# Patient Record
Sex: FEMALE | Race: BLACK OR AFRICAN AMERICAN | NOT HISPANIC OR LATINO | URBAN - METROPOLITAN AREA
[De-identification: names, ages, dates, MRNs, and addresses within clinical notes are randomized per-mention and may not be internally consistent; named-entity substitution may affect disease eponyms.]

---

## 2020-01-15 PROBLEM — 38341003 HYPERTENSION: Status: ACTIVE | Noted: 2020-01-15

## 2021-03-24 ENCOUNTER — OFFICE VISIT (OUTPATIENT)
Dept: URBAN - METROPOLITAN AREA CLINIC 13 | Facility: CLINIC | Age: 76
End: 2021-03-24

## 2021-03-30 ENCOUNTER — OFFICE VISIT (OUTPATIENT)
Dept: URBAN - METROPOLITAN AREA SURGERY CENTER 27 | Facility: SURGERY CENTER | Age: 76
End: 2021-03-30

## 2021-03-30 PROBLEM — 195967001 ASTHMA: Status: ACTIVE | Noted: 2021-03-30

## 2021-03-31 ENCOUNTER — OFFICE VISIT (OUTPATIENT)
Dept: URBAN - METROPOLITAN AREA CLINIC 13 | Facility: CLINIC | Age: 76
End: 2021-03-31

## 2021-08-28 ENCOUNTER — TELEPHONE ENCOUNTER (OUTPATIENT)
Dept: URBAN - METROPOLITAN AREA CLINIC 13 | Facility: CLINIC | Age: 76
End: 2021-08-28

## 2021-08-29 ENCOUNTER — TELEPHONE ENCOUNTER (OUTPATIENT)
Dept: URBAN - METROPOLITAN AREA CLINIC 13 | Facility: CLINIC | Age: 76
End: 2021-08-29

## 2021-08-29 RX ORDER — BUDESONIDE AND FORMOTEROL FUMARATE DIHYDRATE 160; 4.5 UG/1; UG/1
AEROSOL RESPIRATORY (INHALATION)
Status: ACTIVE | COMMUNITY

## 2021-08-29 RX ORDER — NAPROXEN SODIUM 220 MG
TABLET ORAL
Status: ACTIVE | COMMUNITY

## 2021-08-29 RX ORDER — ALBUTEROL SULFATE 90 UG/1
AEROSOL, METERED RESPIRATORY (INHALATION)
Status: ACTIVE | COMMUNITY

## 2021-08-29 RX ORDER — METOPROLOL TARTRATE 50 MG/1
TABLET, FILM COATED ORAL
Status: ACTIVE | COMMUNITY

## 2021-08-29 RX ORDER — IBUPROFEN 200 MG/1
TABLET, COATED ORAL
Status: ACTIVE | COMMUNITY

## 2023-05-05 ENCOUNTER — TELEPHONE ENCOUNTER (OUTPATIENT)
Dept: URBAN - METROPOLITAN AREA CLINIC 44 | Facility: CLINIC | Age: 78
End: 2023-05-05

## 2023-05-05 VITALS — BODY MASS INDEX: 25.52 KG/M2 | WEIGHT: 130 LBS | HEIGHT: 60 IN

## 2023-05-05 RX ORDER — ORAL SEMAGLUTIDE 3 MG/1
AS DIRECTED TABLET ORAL
Status: ACTIVE | COMMUNITY

## 2023-05-05 RX ORDER — IBUPROFEN 200 MG/1
1 TABLET WITH FOOD OR MILK AS NEEDED TABLET, COATED ORAL
COMMUNITY

## 2023-05-05 RX ORDER — METOPROLOL TARTRATE 50 MG/1
1 TABLET WITH FOOD TABLET, FILM COATED ORAL TWICE A DAY
COMMUNITY

## 2023-05-05 RX ORDER — NAPROXEN SODIUM 220 MG
1 TABLET WITH FOOD OR MILK AS NEEDED TABLET ORAL
COMMUNITY

## 2023-05-05 RX ORDER — METFORMIN HYDROCHLORIDE 500 MG/1
TAKE ONE TABLET BY MOUTH TWICE A DAY TABLET, EXTENDED RELEASE ORAL
Qty: 180 UNSPECIFIED | Refills: 0 | Status: ACTIVE | COMMUNITY

## 2023-05-05 RX ORDER — ATORVASTATIN CALCIUM 20 MG/1
TAKE ONE TABLET BY MOUTH ONE TIME DAILY TABLET, FILM COATED ORAL
Qty: 90 UNSPECIFIED | Refills: 0 | Status: ACTIVE | COMMUNITY

## 2023-05-05 RX ORDER — BUDESONIDE AND FORMOTEROL FUMARATE DIHYDRATE 160; 4.5 UG/1; UG/1
AEROSOL RESPIRATORY (INHALATION)
Status: DISCONTINUED | COMMUNITY

## 2023-05-05 RX ORDER — ALBUTEROL SULFATE 90 UG/1
AEROSOL, METERED RESPIRATORY (INHALATION)
Status: DISCONTINUED | COMMUNITY

## 2023-05-08 ENCOUNTER — LAB OUTSIDE AN ENCOUNTER (OUTPATIENT)
Dept: URBAN - METROPOLITAN AREA CLINIC 44 | Facility: CLINIC | Age: 78
End: 2023-05-08

## 2023-05-16 ENCOUNTER — TELEPHONE ENCOUNTER (OUTPATIENT)
Dept: URBAN - METROPOLITAN AREA CLINIC 44 | Facility: CLINIC | Age: 78
End: 2023-05-16

## 2023-05-16 ENCOUNTER — OFFICE VISIT (OUTPATIENT)
Dept: URBAN - METROPOLITAN AREA SURGERY CENTER 28 | Facility: SURGERY CENTER | Age: 78
End: 2023-05-16
Payer: MEDICARE

## 2023-05-16 DIAGNOSIS — R13.19 CERVICAL DYSPHAGIA: ICD-10-CM

## 2023-05-16 DIAGNOSIS — Z98.890 H/O ABDOMINAL SURGERY: ICD-10-CM

## 2023-05-16 DIAGNOSIS — K22.2 ACQUIRED ESOPHAGEAL RING: ICD-10-CM

## 2023-05-16 PROCEDURE — 43248 EGD GUIDE WIRE INSERTION: CPT | Performed by: INTERNAL MEDICINE

## 2023-05-16 PROCEDURE — G8907 PT DOC NO EVENTS ON DISCHARG: HCPCS | Performed by: INTERNAL MEDICINE

## 2023-05-16 RX ORDER — NAPROXEN SODIUM 220 MG
1 TABLET WITH FOOD OR MILK AS NEEDED TABLET ORAL
COMMUNITY

## 2023-05-16 RX ORDER — METFORMIN HYDROCHLORIDE 500 MG/1
TAKE ONE TABLET BY MOUTH TWICE A DAY TABLET, EXTENDED RELEASE ORAL
Qty: 180 UNSPECIFIED | Refills: 0 | Status: ACTIVE | COMMUNITY

## 2023-05-16 RX ORDER — METOPROLOL TARTRATE 50 MG/1
1 TABLET WITH FOOD TABLET, FILM COATED ORAL TWICE A DAY
COMMUNITY

## 2023-05-16 RX ORDER — IBUPROFEN 200 MG/1
1 TABLET WITH FOOD OR MILK AS NEEDED TABLET, COATED ORAL
COMMUNITY

## 2023-05-16 RX ORDER — ORAL SEMAGLUTIDE 3 MG/1
AS DIRECTED TABLET ORAL
Status: ACTIVE | COMMUNITY

## 2023-05-16 RX ORDER — PANTOPRAZOLE SODIUM 40 MG/1
1 TABLET TABLET, DELAYED RELEASE ORAL ONCE A DAY
Qty: 90 TABLET | Refills: 3 | OUTPATIENT
Start: 2023-05-16

## 2023-05-16 RX ORDER — ATORVASTATIN CALCIUM 20 MG/1
TAKE ONE TABLET BY MOUTH ONE TIME DAILY TABLET, FILM COATED ORAL
Qty: 90 UNSPECIFIED | Refills: 0 | Status: ACTIVE | COMMUNITY

## 2023-07-05 ENCOUNTER — OFFICE VISIT (OUTPATIENT)
Dept: URBAN - METROPOLITAN AREA CLINIC 48 | Facility: CLINIC | Age: 78
End: 2023-07-05
Payer: MEDICARE

## 2023-07-05 VITALS
HEART RATE: 69 BPM | DIASTOLIC BLOOD PRESSURE: 65 MMHG | HEIGHT: 60 IN | TEMPERATURE: 97.5 F | WEIGHT: 126.2 LBS | SYSTOLIC BLOOD PRESSURE: 112 MMHG | BODY MASS INDEX: 24.77 KG/M2 | OXYGEN SATURATION: 99 %

## 2023-07-05 DIAGNOSIS — R09.89 GLOBUS SENSATION: ICD-10-CM

## 2023-07-05 DIAGNOSIS — R63.4 WEIGHT LOSS: ICD-10-CM

## 2023-07-05 DIAGNOSIS — R13.14 DYSPHAGIA, PHARYNGOESOPHAGEAL PHASE: ICD-10-CM

## 2023-07-05 DIAGNOSIS — R68.81 EARLY SATIETY: ICD-10-CM

## 2023-07-05 PROCEDURE — 99214 OFFICE O/P EST MOD 30 MIN: CPT | Performed by: PHYSICIAN ASSISTANT

## 2023-07-05 RX ORDER — PANTOPRAZOLE SODIUM 40 MG/1
1 TABLET TABLET, DELAYED RELEASE ORAL ONCE A DAY
Qty: 90 TABLET | Refills: 3 | Status: ACTIVE | COMMUNITY
Start: 2023-05-16

## 2023-07-05 RX ORDER — FAMOTIDINE 40 MG/1
1 TABLET AT BEDTIME TABLET, FILM COATED ORAL ONCE A DAY
Qty: 30 | Refills: 3 | OUTPATIENT
Start: 2023-07-05

## 2023-07-05 RX ORDER — PNV NO.95/FERROUS FUM/FOLIC AC 28MG-0.8MG
AS DIRECTED TABLET ORAL ONCE DAILY
Status: ACTIVE | COMMUNITY

## 2023-07-05 RX ORDER — ATORVASTATIN CALCIUM 20 MG/1
1 TABLET TABLET, FILM COATED ORAL ONCE A DAY
Refills: 0 | Status: ACTIVE | COMMUNITY

## 2023-07-05 RX ORDER — METOPROLOL TARTRATE 50 MG/1
1 TABLET WITH FOOD TABLET, FILM COATED ORAL TWICE A DAY
Status: ACTIVE | COMMUNITY

## 2023-07-05 RX ORDER — METFORMIN HYDROCHLORIDE 500 MG/1
1 TABLET WITH EVENING MEAL TABLET, EXTENDED RELEASE ORAL TWICE A DAY
Refills: 0 | Status: ACTIVE | COMMUNITY

## 2023-07-05 RX ORDER — PANTOPRAZOLE SODIUM 40 MG/1
1 TABLET TABLET, DELAYED RELEASE ORAL ONCE A DAY
OUTPATIENT
Start: 2023-05-16

## 2023-07-05 RX ORDER — IBUPROFEN 200 MG/1
1 TABLET WITH FOOD OR MILK AS NEEDED TABLET, COATED ORAL
Status: ACTIVE | COMMUNITY

## 2023-07-05 NOTE — HPI-TODAY'S VISIT:
76 y/o female presents for follow up from EGD done in May for dysphagia which revealed an intact Nissen and benign esophageal stenosis dilated to 48 FR. She continues to have episodes of dysphagia, as well as globus sensation, throat clearing, hoarseness, and cough. The chest pain she was having has improved since being on Pantoprazole daily which she continues to take. She does admit to some post nasal drainage and has not taken anything for this. She has early satiety and belching, but denies epigastric pain or N/V. She was diagnosed with DM in early 2021 and is on Metformin; she was put on Rybelsus in January which was discontinued 3 weeks ago. She has lost 20-25 pounds since January. Additionally, she rpeorts recent change in bowels in the last several weeks with episdoes of post-prandial diarrhea; denies nocturnal diarrhea or blood in the stool. Last colonoscopy was done 11/2014 with diverticulosis and internal hemorrhoids.

## 2023-07-12 ENCOUNTER — LAB OUTSIDE AN ENCOUNTER (OUTPATIENT)
Dept: URBAN - METROPOLITAN AREA CLINIC 46 | Facility: CLINIC | Age: 78
End: 2023-07-12

## 2023-07-17 ENCOUNTER — TELEPHONE ENCOUNTER (OUTPATIENT)
Dept: URBAN - METROPOLITAN AREA CLINIC 46 | Facility: CLINIC | Age: 78
End: 2023-07-17

## 2023-07-17 LAB
ADENOVIRUS F 40/41: NOT DETECTED
CALPROTECTIN, STOOL - QDX: (no result)
CAMPYLOBACTER: NOT DETECTED
CLOSTRIDIUM DIFFICILE: NOT DETECTED
ENTAMOEBA HISTOLYTICA: NOT DETECTED
ENTEROAGGREGATIVE E.COLI: NOT DETECTED
ENTEROTOXIGENIC E.COLI: NOT DETECTED
ESCHERICHIA COLI O157: NOT DETECTED
GIARDIA LAMBLIA: NOT DETECTED
NOROVIRUS GI/GII: NOT DETECTED
PANCREATICELASTASE ELISA, STOOL: (no result)
ROTAVIRUS A: NOT DETECTED
SALMONELLA SPP.: NOT DETECTED
SHIGA-LIKE TOXIN PRODUCING E.COLI: NOT DETECTED
SHIGELLA SPP. / ENTEROINVASIVE E.COLI: NOT DETECTED
VIBRIO PARAHAEMOLYTICUS: NOT DETECTED
VIBRIO SPP.: NOT DETECTED
YERSINIA ENTEROCOLITICA: NOT DETECTED

## 2023-07-17 RX ORDER — PANCRELIPASE 36000; 180000; 114000 [USP'U]/1; [USP'U]/1; [USP'U]/1
AS DIRECTED CAPSULE, DELAYED RELEASE PELLETS ORAL
Qty: 250 | Refills: 5 | OUTPATIENT
Start: 2023-07-17

## 2023-07-18 ENCOUNTER — TELEPHONE ENCOUNTER (OUTPATIENT)
Dept: URBAN - METROPOLITAN AREA CLINIC 46 | Facility: CLINIC | Age: 78
End: 2023-07-18

## 2023-07-19 ENCOUNTER — OFFICE VISIT (OUTPATIENT)
Dept: URBAN - METROPOLITAN AREA SURGERY CENTER 28 | Facility: SURGERY CENTER | Age: 78
End: 2023-07-19

## 2023-08-08 ENCOUNTER — CLAIMS CREATED FROM THE CLAIM WINDOW (OUTPATIENT)
Dept: URBAN - METROPOLITAN AREA CLINIC 4 | Facility: CLINIC | Age: 78
End: 2023-08-08
Payer: MEDICARE

## 2023-08-08 ENCOUNTER — OUT OF OFFICE VISIT (OUTPATIENT)
Dept: URBAN - METROPOLITAN AREA SURGERY CENTER 28 | Facility: SURGERY CENTER | Age: 78
End: 2023-08-08
Payer: MEDICARE

## 2023-08-08 DIAGNOSIS — C18.9 MALIGNANT NEOPLASM OF COLON, UNSPECIFIED: ICD-10-CM

## 2023-08-08 DIAGNOSIS — R13.19 CERVICAL DYSPHAGIA: ICD-10-CM

## 2023-08-08 DIAGNOSIS — D12.2 ADENOMA OF ASCENDING COLON: ICD-10-CM

## 2023-08-08 DIAGNOSIS — K22.2 ACQUIRED ESOPHAGEAL RING: ICD-10-CM

## 2023-08-08 DIAGNOSIS — K63.5 BENIGN COLON POLYPS: ICD-10-CM

## 2023-08-08 DIAGNOSIS — R68.89 ERYTHEMATOUS MUCOSA: ICD-10-CM

## 2023-08-08 DIAGNOSIS — K22.89 DILATATION OF ESOPHAGUS: ICD-10-CM

## 2023-08-08 DIAGNOSIS — K21.9 ACID REFLUX: ICD-10-CM

## 2023-08-08 DIAGNOSIS — R63.4 ABNORMAL INTENTIONAL WEIGHT LOSS: ICD-10-CM

## 2023-08-08 DIAGNOSIS — C18.6 ADENOCARCINOMA OF DESCENDING COLON: ICD-10-CM

## 2023-08-08 DIAGNOSIS — K22.89 OTHER SPECIFIED DISEASE OF ESOPHAGUS: ICD-10-CM

## 2023-08-08 DIAGNOSIS — R13.10 DYSPHAGIA: ICD-10-CM

## 2023-08-08 DIAGNOSIS — R63.4 WEIGHT LOSS: ICD-10-CM

## 2023-08-08 PROCEDURE — 43248 EGD GUIDE WIRE INSERTION: CPT | Performed by: INTERNAL MEDICINE

## 2023-08-08 PROCEDURE — 88305 TISSUE EXAM BY PATHOLOGIST: CPT | Performed by: PATHOLOGY

## 2023-08-08 PROCEDURE — G8907 PT DOC NO EVENTS ON DISCHARG: HCPCS | Performed by: INTERNAL MEDICINE

## 2023-08-08 PROCEDURE — 45381 COLONOSCOPY SUBMUCOUS NJX: CPT | Performed by: INTERNAL MEDICINE

## 2023-08-08 PROCEDURE — 00813 ANES UPR LWR GI NDSC PX: CPT | Performed by: ANESTHESIOLOGY

## 2023-08-08 PROCEDURE — 88341 IMHCHEM/IMCYTCHM EA ADD ANTB: CPT | Performed by: PATHOLOGY

## 2023-08-08 PROCEDURE — 88342 IMHCHEM/IMCYTCHM 1ST ANTB: CPT | Performed by: PATHOLOGY

## 2023-08-08 PROCEDURE — 45380 COLONOSCOPY AND BIOPSY: CPT | Performed by: INTERNAL MEDICINE

## 2023-08-08 RX ORDER — METFORMIN HYDROCHLORIDE 500 MG/1
1 TABLET WITH EVENING MEAL TABLET, EXTENDED RELEASE ORAL TWICE A DAY
Refills: 0 | Status: ACTIVE | COMMUNITY

## 2023-08-08 RX ORDER — ATORVASTATIN CALCIUM 20 MG/1
1 TABLET TABLET, FILM COATED ORAL ONCE A DAY
Refills: 0 | Status: ACTIVE | COMMUNITY

## 2023-08-08 RX ORDER — PNV NO.95/FERROUS FUM/FOLIC AC 28MG-0.8MG
AS DIRECTED TABLET ORAL ONCE DAILY
Status: ACTIVE | COMMUNITY

## 2023-08-08 RX ORDER — PANTOPRAZOLE SODIUM 40 MG/1
1 TABLET TABLET, DELAYED RELEASE ORAL ONCE A DAY
Status: ACTIVE | COMMUNITY
Start: 2023-05-16

## 2023-08-08 RX ORDER — PANCRELIPASE 36000; 180000; 114000 [USP'U]/1; [USP'U]/1; [USP'U]/1
AS DIRECTED CAPSULE, DELAYED RELEASE PELLETS ORAL
Qty: 250 | Refills: 5 | Status: ACTIVE | COMMUNITY
Start: 2023-07-17

## 2023-08-08 RX ORDER — FAMOTIDINE 40 MG/1
1 TABLET AT BEDTIME TABLET, FILM COATED ORAL ONCE A DAY
Qty: 30 | Refills: 3 | Status: ACTIVE | COMMUNITY
Start: 2023-07-05

## 2023-08-08 RX ORDER — IBUPROFEN 200 MG/1
1 TABLET WITH FOOD OR MILK AS NEEDED TABLET, COATED ORAL
Status: ACTIVE | COMMUNITY

## 2023-08-08 RX ORDER — METOPROLOL TARTRATE 50 MG/1
1 TABLET WITH FOOD TABLET, FILM COATED ORAL TWICE A DAY
Status: ACTIVE | COMMUNITY

## 2023-08-09 ENCOUNTER — TELEPHONE ENCOUNTER (OUTPATIENT)
Dept: URBAN - METROPOLITAN AREA CLINIC 48 | Facility: CLINIC | Age: 78
End: 2023-08-09

## 2023-08-10 ENCOUNTER — TELEPHONE ENCOUNTER (OUTPATIENT)
Dept: URBAN - METROPOLITAN AREA SURGERY CENTER 28 | Facility: SURGERY CENTER | Age: 78
End: 2023-08-10

## 2023-08-10 PROBLEM — 363409003: Status: ACTIVE | Noted: 2023-08-10

## 2023-08-21 ENCOUNTER — TELEPHONE ENCOUNTER (OUTPATIENT)
Dept: URBAN - METROPOLITAN AREA CLINIC 63 | Facility: CLINIC | Age: 78
End: 2023-08-21

## 2023-11-20 ENCOUNTER — CLAIMS CREATED FROM THE CLAIM WINDOW (OUTPATIENT)
Dept: URBAN - METROPOLITAN AREA CLINIC 48 | Facility: CLINIC | Age: 78
End: 2023-11-20
Payer: MEDICARE

## 2023-11-20 VITALS
SYSTOLIC BLOOD PRESSURE: 129 MMHG | BODY MASS INDEX: 24.3 KG/M2 | WEIGHT: 123.8 LBS | HEIGHT: 60 IN | OXYGEN SATURATION: 97 % | HEART RATE: 72 BPM | TEMPERATURE: 97.5 F | DIASTOLIC BLOOD PRESSURE: 67 MMHG

## 2023-11-20 DIAGNOSIS — K21.9 ACID REFLUX: ICD-10-CM

## 2023-11-20 DIAGNOSIS — R13.14 DYSPHAGIA, PHARYNGOESOPHAGEAL PHASE: ICD-10-CM

## 2023-11-20 DIAGNOSIS — C18.6 MALIGNANT NEOPLASM OF DESCENDING COLON: ICD-10-CM

## 2023-11-20 DIAGNOSIS — R63.4 WEIGHT LOSS: ICD-10-CM

## 2023-11-20 PROBLEM — 266435005: Status: ACTIVE | Noted: 2023-11-20

## 2023-11-20 PROCEDURE — 99214 OFFICE O/P EST MOD 30 MIN: CPT | Performed by: PHYSICIAN ASSISTANT

## 2023-11-20 RX ORDER — FAMOTIDINE 40 MG/1
1 TABLET AT BEDTIME TABLET, FILM COATED ORAL ONCE A DAY
Qty: 30 | Refills: 3 | Status: ACTIVE | COMMUNITY
Start: 2023-07-05

## 2023-11-20 RX ORDER — METFORMIN HYDROCHLORIDE 500 MG/1
1 TABLET WITH EVENING MEAL TABLET, EXTENDED RELEASE ORAL TWICE A DAY
Refills: 0 | Status: ACTIVE | COMMUNITY

## 2023-11-20 RX ORDER — PANTOPRAZOLE SODIUM 40 MG/1
1 TABLET TABLET, DELAYED RELEASE ORAL ONCE A DAY
Status: ACTIVE | COMMUNITY
Start: 2023-05-16

## 2023-11-20 RX ORDER — IBUPROFEN 200 MG/1
1 TABLET WITH FOOD OR MILK AS NEEDED TABLET, COATED ORAL
Status: ACTIVE | COMMUNITY

## 2023-11-20 RX ORDER — PANTOPRAZOLE SODIUM 40 MG/1
1 TABLET TABLET, DELAYED RELEASE ORAL ONCE A DAY
Qty: 90 | Refills: 3
Start: 2023-05-16

## 2023-11-20 RX ORDER — PNV NO.95/FERROUS FUM/FOLIC AC 28MG-0.8MG
AS DIRECTED TABLET ORAL ONCE DAILY
Status: ACTIVE | COMMUNITY

## 2023-11-20 RX ORDER — METOPROLOL TARTRATE 50 MG/1
1 TABLET WITH FOOD TABLET, FILM COATED ORAL TWICE A DAY
Status: ACTIVE | COMMUNITY

## 2023-11-20 RX ORDER — ATORVASTATIN CALCIUM 20 MG/1
1 TABLET TABLET, FILM COATED ORAL ONCE A DAY
Refills: 0 | Status: ACTIVE | COMMUNITY

## 2023-11-20 RX ORDER — FAMOTIDINE 40 MG/1
1 TABLET AT BEDTIME TABLET, FILM COATED ORAL ONCE A DAY
Qty: 90 | Refills: 3
Start: 2023-07-05

## 2023-11-20 NOTE — HPI-TODAY'S VISIT:
76 y/o female was seen in July with c/o dyspepsia, dysphagia, and diarrhea. EGD done in May for dysphagia revealed an intact Nissen and benign esophageal stenosis dilated to 48 FR. She continued to have episodes of dysphagia, as well as globus sensation, throat clearing, hoarseness, and cough. The chest pain she was having has improved since being on Pantoprazole daily which she continued to take. She also c/o satiety and belching, but denies epigastric pain or N/V. She was diagnosed with DM in early 2021 and is on Metformin; she was put on Rybelsus in January which was discontinued 3 prior to when she was seen in June with reported 20-25 pound weight loss since January. Additionally, she reported recent change in bowels in the last several weeks with episdoes of post-prandial diarrhea; denied nocturnal diarrhea or blood in the stool. Colonoscopy done 11/2014 with diverticulosis and internal hemorrhoids. We added Famotidine to her regimen qhs and checked stool studies which showed an elevated fecal calprotectin and pancreatic insufficiency, felt to be from advanced age and DM. She took Creon for a period with improvement. NM GE study in July was within normal limits. EGD/Colon 8/2023 revealed a benign esophageal stricture s/p dilation to 54 FR (esophageal Bx unremarkable); colon with diverticulosis, non-bleeding internal hemorrhoids, three 5-8 mm polyps (TA and HP) and a malignancy tumor in the descening colon (path withi invasive adenocarcinoma, moderately differentiated). Random colon Bx were normal.   Returns today 11/20/23. She is s/p partial colectomy by Dr. Hema Ponce in August. She states since surgery, bowels have regulated with 1-2 stools/day. She still feels gassey; denies blood in the stool. She is off Creon. She is being followed by Dr. Vazquez, heme/onc in [place], and had a second opinion from [facility]. She states she did not have metastatic disease (CRC Stage I based on heme/onc note) and is being monitored. Dysphagia has resolved with dilation. She is happy with Pantoprazole in AM and Famotidine in PM regimen, and requests refills. Weight is stable.

## 2024-04-12 ENCOUNTER — TELPHEP (OUTPATIENT)
Dept: URBAN - METROPOLITAN AREA TELEHEALTH 2 | Facility: TELEHEALTH | Age: 79
End: 2024-04-12
Payer: MEDICARE

## 2024-04-12 DIAGNOSIS — R93.2 ABNORMAL LIVER DIAGNOSTIC IMAGING: ICD-10-CM

## 2024-04-12 DIAGNOSIS — R13.14 DYSPHAGIA, PHARYNGOESOPHAGEAL PHASE: ICD-10-CM

## 2024-04-12 DIAGNOSIS — C18.6 MALIGNANT NEOPLASM OF DESCENDING COLON: ICD-10-CM

## 2024-04-12 DIAGNOSIS — K21.9 GASTROESOPHAGEAL REFLUX DISEASE WITHOUT ESOPHAGITIS: ICD-10-CM

## 2024-04-12 DIAGNOSIS — R63.4 WEIGHT LOSS: ICD-10-CM

## 2024-04-12 PROBLEM — 274527008: Status: ACTIVE | Noted: 2024-04-12

## 2024-04-12 PROBLEM — 89362005: Status: ACTIVE | Noted: 2024-04-12

## 2024-04-12 PROCEDURE — 99213 OFFICE O/P EST LOW 20 MIN: CPT | Performed by: NURSE PRACTITIONER

## 2024-04-12 RX ORDER — PANTOPRAZOLE SODIUM 40 MG/1
1 TABLET TABLET, DELAYED RELEASE ORAL ONCE A DAY
Qty: 90 | Refills: 3 | Status: ACTIVE | COMMUNITY
Start: 2023-05-16

## 2024-04-12 RX ORDER — GLIMEPIRIDE 1 MG/1
1 TABLET WITH BREAKFAST OR THE FIRST MAIN MEAL OF THE DAY TABLET ORAL ONCE A DAY
Status: ACTIVE | COMMUNITY

## 2024-04-12 RX ORDER — METFORMIN HYDROCHLORIDE 500 MG/1
1 TABLET WITH EVENING MEAL TABLET, EXTENDED RELEASE ORAL TWICE A DAY
Refills: 0 | Status: ACTIVE | COMMUNITY

## 2024-04-12 RX ORDER — ATORVASTATIN CALCIUM 20 MG/1
1 TABLET TABLET, FILM COATED ORAL ONCE A DAY
Refills: 0 | Status: ACTIVE | COMMUNITY

## 2024-04-12 RX ORDER — METOPROLOL TARTRATE 50 MG/1
1 TABLET WITH FOOD TABLET, FILM COATED ORAL TWICE A DAY
Status: ACTIVE | COMMUNITY

## 2024-04-12 RX ORDER — FAMOTIDINE 40 MG/1
1 TABLET AT BEDTIME TABLET, FILM COATED ORAL ONCE A DAY
Qty: 90 | Refills: 3 | Status: ACTIVE | COMMUNITY
Start: 2023-07-05

## 2024-04-12 RX ORDER — PNV NO.95/FERROUS FUM/FOLIC AC 28MG-0.8MG
AS DIRECTED TABLET ORAL ONCE DAILY
Status: ACTIVE | COMMUNITY

## 2024-04-12 RX ORDER — IBUPROFEN 200 MG/1
1 TABLET WITH FOOD OR MILK AS NEEDED TABLET, COATED ORAL
Status: ACTIVE | COMMUNITY

## 2024-04-12 NOTE — HPI-TODAY'S VISIT:
77 [facility]/o female [facility]. She has a histotry of dyspepsia, dysphagia, and diarrhea. EGD with dilatation was performed in 8/2023 (54 Fr) which helped some. Mild dysphagia continues but has not reaklly worsened. GERD is well controlled on Pantoprazole 40mg and Famotidine 40mg at night. NM GE study in 2023 was within normal limits.  She has a history of diarrhea. Stool studies which showed an elevated fecal calprotectin and pancreatic insufficiency, felt to be from advanced age and DM. She took Creon for a period with improvement but no longer needs it. Colon 8/2023 with diverticulosis, non-bleeding internal hemorrhoids, three 5-8 mm polyps (TA and HP) and a malignancy tumor in the descening colon (path withi invasive adenocarcinoma, moderately differentiated). Random colon Bx were normal.  She is s/p partial colectomy by Dr. Hema Ponce in August. is being followed by Dr. Vazquez, heme/onc in [place], and had a second opinion from [facility]. Recent imaging showed concerns for liver lesions and she had a liver biopsy 4/8/24. Pathology results are pending. Her weight is steady and she denies abdominal pain.

## 2024-08-05 ENCOUNTER — OFFICE VISIT (OUTPATIENT)
Dept: URBAN - METROPOLITAN AREA CLINIC 48 | Facility: CLINIC | Age: 79
End: 2024-08-05

## 2024-08-05 RX ORDER — GLIMEPIRIDE 1 MG/1
1 TABLET WITH BREAKFAST OR THE FIRST MAIN MEAL OF THE DAY TABLET ORAL ONCE A DAY
Status: ACTIVE | COMMUNITY

## 2024-08-05 RX ORDER — IBUPROFEN 200 MG/1
1 TABLET WITH FOOD OR MILK AS NEEDED TABLET, COATED ORAL
Status: ACTIVE | COMMUNITY

## 2024-08-05 RX ORDER — METOPROLOL TARTRATE 50 MG/1
1 TABLET WITH FOOD TABLET, FILM COATED ORAL TWICE A DAY
Status: ACTIVE | COMMUNITY

## 2024-08-05 RX ORDER — PANTOPRAZOLE SODIUM 40 MG/1
1 TABLET TABLET, DELAYED RELEASE ORAL ONCE A DAY
Qty: 90 | Refills: 3 | Status: ACTIVE | COMMUNITY
Start: 2023-05-16

## 2024-08-05 RX ORDER — METFORMIN HYDROCHLORIDE 500 MG/1
1 TABLET WITH EVENING MEAL TABLET, EXTENDED RELEASE ORAL TWICE A DAY
Refills: 0 | Status: ACTIVE | COMMUNITY

## 2024-08-05 RX ORDER — FAMOTIDINE 40 MG/1
1 TABLET AT BEDTIME TABLET, FILM COATED ORAL ONCE A DAY
Qty: 90 | Refills: 3 | Status: ACTIVE | COMMUNITY
Start: 2023-07-05

## 2024-08-05 RX ORDER — ATORVASTATIN CALCIUM 20 MG/1
1 TABLET TABLET, FILM COATED ORAL ONCE A DAY
Refills: 0 | Status: ACTIVE | COMMUNITY

## 2024-08-05 RX ORDER — PNV NO.95/FERROUS FUM/FOLIC AC 28MG-0.8MG
AS DIRECTED TABLET ORAL ONCE DAILY
Status: ACTIVE | COMMUNITY

## 2024-08-27 ENCOUNTER — OFFICE VISIT (OUTPATIENT)
Dept: URBAN - METROPOLITAN AREA CLINIC 48 | Facility: CLINIC | Age: 79
End: 2024-08-27

## 2024-08-28 ENCOUNTER — DASHBOARD ENCOUNTERS (OUTPATIENT)
Age: 79
End: 2024-08-28

## 2024-08-29 ENCOUNTER — OFFICE VISIT (OUTPATIENT)
Dept: URBAN - METROPOLITAN AREA CLINIC 48 | Facility: CLINIC | Age: 79
End: 2024-08-29

## 2024-08-29 RX ORDER — FAMOTIDINE 40 MG/1
1 TABLET AT BEDTIME TABLET, FILM COATED ORAL ONCE A DAY
Qty: 90 | Refills: 3 | Status: ACTIVE | COMMUNITY
Start: 2023-07-05

## 2024-08-29 RX ORDER — METOPROLOL TARTRATE 50 MG/1
1 TABLET WITH FOOD TABLET, FILM COATED ORAL TWICE A DAY
Status: ACTIVE | COMMUNITY

## 2024-08-29 RX ORDER — GLIMEPIRIDE 1 MG/1
1 TABLET WITH BREAKFAST OR THE FIRST MAIN MEAL OF THE DAY TABLET ORAL ONCE A DAY
Status: ACTIVE | COMMUNITY

## 2024-08-29 RX ORDER — IBUPROFEN 200 MG/1
1 TABLET WITH FOOD OR MILK AS NEEDED TABLET, COATED ORAL
Status: ACTIVE | COMMUNITY

## 2024-08-29 RX ORDER — METFORMIN HYDROCHLORIDE 500 MG/1
1 TABLET WITH EVENING MEAL TABLET, EXTENDED RELEASE ORAL TWICE A DAY
Refills: 0 | Status: ACTIVE | COMMUNITY

## 2024-08-29 RX ORDER — PNV NO.95/FERROUS FUM/FOLIC AC 28MG-0.8MG
AS DIRECTED TABLET ORAL ONCE DAILY
Status: ACTIVE | COMMUNITY

## 2024-08-29 RX ORDER — ATORVASTATIN CALCIUM 20 MG/1
1 TABLET TABLET, FILM COATED ORAL ONCE A DAY
Refills: 0 | Status: ACTIVE | COMMUNITY

## 2024-08-29 RX ORDER — PANTOPRAZOLE SODIUM 40 MG/1
1 TABLET TABLET, DELAYED RELEASE ORAL ONCE A DAY
Qty: 90 | Refills: 3 | Status: ACTIVE | COMMUNITY
Start: 2023-05-16

## 2024-10-08 ENCOUNTER — OFFICE VISIT (OUTPATIENT)
Dept: URBAN - METROPOLITAN AREA CLINIC 48 | Facility: CLINIC | Age: 79
End: 2024-10-08
Payer: MEDICARE

## 2024-10-08 ENCOUNTER — LAB OUTSIDE AN ENCOUNTER (OUTPATIENT)
Dept: URBAN - METROPOLITAN AREA CLINIC 48 | Facility: CLINIC | Age: 79
End: 2024-10-08

## 2024-10-08 VITALS
HEIGHT: 60 IN | BODY MASS INDEX: 21.46 KG/M2 | DIASTOLIC BLOOD PRESSURE: 55 MMHG | WEIGHT: 109.3 LBS | SYSTOLIC BLOOD PRESSURE: 126 MMHG | TEMPERATURE: 97.2 F | HEART RATE: 68 BPM

## 2024-10-08 DIAGNOSIS — C18.6 MALIGNANT NEOPLASM OF DESCENDING COLON: ICD-10-CM

## 2024-10-08 DIAGNOSIS — R13.14 DYSPHAGIA, PHARYNGOESOPHAGEAL PHASE: ICD-10-CM

## 2024-10-08 DIAGNOSIS — R93.2 ABNORMAL LIVER DIAGNOSTIC IMAGING: ICD-10-CM

## 2024-10-08 DIAGNOSIS — C85.90 LYMPHOMA, UNSPECIFIED BODY REGION, UNSPECIFIED LYMPHOMA TYPE: ICD-10-CM

## 2024-10-08 DIAGNOSIS — K21.9 GASTROESOPHAGEAL REFLUX DISEASE WITHOUT ESOPHAGITIS: ICD-10-CM

## 2024-10-08 DIAGNOSIS — R19.4 CHANGE IN BOWEL HABIT: ICD-10-CM

## 2024-10-08 DIAGNOSIS — R63.4 WEIGHT LOSS: ICD-10-CM

## 2024-10-08 PROBLEM — 118600007: Status: ACTIVE | Noted: 2024-10-08

## 2024-10-08 PROCEDURE — 99214 OFFICE O/P EST MOD 30 MIN: CPT | Performed by: NURSE PRACTITIONER

## 2024-10-08 RX ORDER — FAMOTIDINE 40 MG/1
1 TABLET AT BEDTIME TABLET, FILM COATED ORAL ONCE A DAY
Qty: 90 | Refills: 3 | Status: ACTIVE | COMMUNITY
Start: 2023-07-05

## 2024-10-08 RX ORDER — ATORVASTATIN CALCIUM 20 MG/1
1 TABLET TABLET, FILM COATED ORAL ONCE A DAY
Refills: 0 | Status: ACTIVE | COMMUNITY

## 2024-10-08 RX ORDER — METFORMIN HYDROCHLORIDE 500 MG/1
1 TABLET WITH EVENING MEAL TABLET, EXTENDED RELEASE ORAL TWICE A DAY
Refills: 0 | Status: ACTIVE | COMMUNITY

## 2024-10-08 RX ORDER — AZELASTINE HYDROCHLORIDE 137 UG/1
1 PUFF IN EACH NOSTRIL SPRAY, METERED NASAL TWICE A DAY
Status: ACTIVE | COMMUNITY

## 2024-10-08 RX ORDER — CIPROFLOXACIN 500 MG/1
1 TABLET TABLET ORAL
Refills: 0 | Status: ACTIVE | COMMUNITY

## 2024-10-08 RX ORDER — VALACYCLOVIR 500 MG/1
1 TABLET TABLET ORAL ONCE A DAY
Qty: 30 TABLET | Status: ACTIVE | COMMUNITY

## 2024-10-08 RX ORDER — METOPROLOL TARTRATE 50 MG/1
1 TABLET WITH FOOD TABLET, FILM COATED ORAL TWICE A DAY
Status: ACTIVE | COMMUNITY

## 2024-10-08 RX ORDER — GLIMEPIRIDE 1 MG/1
1 TABLET WITH BREAKFAST OR THE FIRST MAIN MEAL OF THE DAY TABLET ORAL ONCE A DAY
Status: ACTIVE | COMMUNITY

## 2024-10-08 RX ORDER — PANTOPRAZOLE SODIUM 40 MG/1
1 TABLET TABLET, DELAYED RELEASE ORAL ONCE A DAY
Qty: 90 | Refills: 3 | Status: ACTIVE | COMMUNITY
Start: 2023-05-16

## 2024-10-08 RX ORDER — PNV NO.95/FERROUS FUM/FOLIC AC 28MG-0.8MG
AS DIRECTED TABLET ORAL ONCE DAILY
Status: ACTIVE | COMMUNITY

## 2024-10-08 NOTE — HPI-TODAY'S VISIT:
78 y/o female presents for follow up. She has a histotry of dyspepsia, dysphagia, and diarrhea. EGD with dilatation was performed in 8/2023 (54 Fr). The patient has had worsening dysphagia recently even to water/liquids. GERD is well controlled on Pantoprazole 40mg and Famotidine 40mg at night. NM GE study in 2023 was within normal limits.  She has a history of diarrhea. Stool studies which showed an elevated fecal calprotectin and pancreatic insufficiency, felt to be from advanced age and DM. She took Creon for a period with improvement but no longer needs it. Bowel movements are still loose and worsened with chemotherapy. Colon 8/2023 with diverticulosis, non-bleeding internal hemorrhoids, three 5-8 mm polyps (TA and HP) and an invasive adenocarcinoma, moderately differentiated int he descending colon. Random colon Bx were normal.  She is s/p partial colectomy by Dr. Hema Ponce in 8/2023 and a 1 year follow up was recommended. She is being followed by Dr. Vazquez, bettina/onc in [place], and had a second opinion from [facility]. 4/2024 imaging showed concerns for liver lesions and she had a liver biopsy 4/8/24. Since her last appointment here, she was diagnosed wlith lymphoma and is taking chemotherapy. The patient has lost 20 lb since then. Reports no appetite. Recently, she has taken TPN. Her oncologist felt she needed to wait on colonoscopy.

## 2024-11-27 ENCOUNTER — LAB OUTSIDE AN ENCOUNTER (OUTPATIENT)
Dept: URBAN - METROPOLITAN AREA CLINIC 48 | Facility: CLINIC | Age: 79
End: 2024-11-27

## 2024-11-27 ENCOUNTER — TELEPHONE ENCOUNTER (OUTPATIENT)
Dept: URBAN - METROPOLITAN AREA CLINIC 48 | Facility: CLINIC | Age: 79
End: 2024-11-27

## 2024-12-03 ENCOUNTER — OFFICE VISIT (OUTPATIENT)
Dept: URBAN - METROPOLITAN AREA SURGERY CENTER 28 | Facility: SURGERY CENTER | Age: 79
End: 2024-12-03
Payer: MEDICARE

## 2024-12-03 DIAGNOSIS — K22.2 ESOPHAGEAL OBSTRUCTION: ICD-10-CM

## 2024-12-03 DIAGNOSIS — Z98.0 H/O BILLROTH II OPERATION: ICD-10-CM

## 2024-12-03 DIAGNOSIS — Z09 ENCOUNTER FOR FOLLOW-UP EXAMINATION AFTER COMPLETED TREATMENT FOR CONDITIONS OTHER THAN MALIGNANT NEOPLASM: ICD-10-CM

## 2024-12-03 DIAGNOSIS — R13.19 OTHER DYSPHAGIA: ICD-10-CM

## 2024-12-03 DIAGNOSIS — Z09 ENCNTR FOR F/U EXAM AFT TRTMT FOR COND OTH THAN MALIG NEOPLM: ICD-10-CM

## 2024-12-03 DIAGNOSIS — K22.2 ESOPHAGEAL STENOSIS: ICD-10-CM

## 2024-12-03 DIAGNOSIS — Z85.038 PERSONAL HISTORY OF OTHER MALIGNANT NEOPLASM OF LARGE INTESTINE: ICD-10-CM

## 2024-12-03 DIAGNOSIS — Z86.0100 PERSONAL HISTORY OF COLON POLYPS, UNSPECIFIED: ICD-10-CM

## 2024-12-03 PROCEDURE — G0105 COLORECTAL SCRN; HI RISK IND: HCPCS | Performed by: INTERNAL MEDICINE

## 2024-12-03 PROCEDURE — G0105 COLORECTAL SCRN; HI RISK IND: HCPCS | Performed by: CLINIC/CENTER

## 2024-12-03 PROCEDURE — 00813 ANES UPR LWR GI NDSC PX: CPT | Performed by: ANESTHESIOLOGY

## 2024-12-03 PROCEDURE — 43248 EGD GUIDE WIRE INSERTION: CPT | Performed by: INTERNAL MEDICINE

## 2024-12-03 PROCEDURE — 43248 EGD GUIDE WIRE INSERTION: CPT | Performed by: CLINIC/CENTER

## 2024-12-03 RX ORDER — VALACYCLOVIR 500 MG/1
1 TABLET TABLET ORAL ONCE A DAY
Qty: 30 TABLET | Status: ACTIVE | COMMUNITY

## 2024-12-03 RX ORDER — ATORVASTATIN CALCIUM 20 MG/1
1 TABLET TABLET, FILM COATED ORAL ONCE A DAY
Refills: 0 | Status: ACTIVE | COMMUNITY

## 2024-12-03 RX ORDER — PNV NO.95/FERROUS FUM/FOLIC AC 28MG-0.8MG
AS DIRECTED TABLET ORAL ONCE DAILY
Status: ACTIVE | COMMUNITY

## 2024-12-03 RX ORDER — METFORMIN HYDROCHLORIDE 500 MG/1
1 TABLET WITH EVENING MEAL TABLET, EXTENDED RELEASE ORAL TWICE A DAY
Refills: 0 | Status: ACTIVE | COMMUNITY

## 2024-12-03 RX ORDER — GLIMEPIRIDE 1 MG/1
1 TABLET WITH BREAKFAST OR THE FIRST MAIN MEAL OF THE DAY TABLET ORAL ONCE A DAY
Status: ACTIVE | COMMUNITY

## 2024-12-03 RX ORDER — METOPROLOL TARTRATE 50 MG/1
1 TABLET WITH FOOD TABLET, FILM COATED ORAL TWICE A DAY
Status: ACTIVE | COMMUNITY

## 2024-12-03 RX ORDER — AZELASTINE HYDROCHLORIDE 137 UG/1
1 PUFF IN EACH NOSTRIL SPRAY, METERED NASAL TWICE A DAY
Status: ACTIVE | COMMUNITY

## 2024-12-03 RX ORDER — CIPROFLOXACIN 500 MG/1
1 TABLET TABLET ORAL
Refills: 0 | Status: ACTIVE | COMMUNITY

## 2024-12-03 RX ORDER — FAMOTIDINE 40 MG/1
1 TABLET AT BEDTIME TABLET, FILM COATED ORAL ONCE A DAY
Qty: 90 | Refills: 3 | Status: ACTIVE | COMMUNITY
Start: 2023-07-05

## 2024-12-03 RX ORDER — PANTOPRAZOLE SODIUM 40 MG/1
1 TABLET TABLET, DELAYED RELEASE ORAL ONCE A DAY
Qty: 90 | Refills: 3 | Status: ACTIVE | COMMUNITY
Start: 2023-05-16

## 2024-12-17 ENCOUNTER — OFFICE VISIT (OUTPATIENT)
Dept: URBAN - METROPOLITAN AREA SURGERY CENTER 28 | Facility: SURGERY CENTER | Age: 79
End: 2024-12-17

## 2025-02-17 ENCOUNTER — ERX REFILL RESPONSE (OUTPATIENT)
Dept: URBAN - METROPOLITAN AREA CLINIC 44 | Facility: CLINIC | Age: 80
End: 2025-02-17

## 2025-02-17 RX ORDER — FAMOTIDINE 40 MG/1
1 TABLET AT BEDTIME TABLET, FILM COATED ORAL ONCE A DAY
Qty: 90 | Refills: 3 | OUTPATIENT

## 2025-02-17 RX ORDER — PANTOPRAZOLE SODIUM 40 MG/1
TAKE ONE TABLET BY MOUTH ONE TIME DAILY TABLET, DELAYED RELEASE ORAL
Qty: 90 TABLET | Refills: 3 | OUTPATIENT

## 2025-02-17 RX ORDER — PANTOPRAZOLE SODIUM 40 MG/1
1 TABLET TABLET, DELAYED RELEASE ORAL ONCE A DAY
Qty: 90 | Refills: 3 | OUTPATIENT

## 2025-04-22 ENCOUNTER — OFFICE VISIT (OUTPATIENT)
Dept: URBAN - METROPOLITAN AREA CLINIC 48 | Facility: CLINIC | Age: 80
End: 2025-04-22
Payer: MEDICARE

## 2025-04-22 DIAGNOSIS — Z85.038 PERSONAL HISTORY OF COLON CANCER: ICD-10-CM

## 2025-04-22 DIAGNOSIS — K21.9 GASTROESOPHAGEAL REFLUX DISEASE WITHOUT ESOPHAGITIS: ICD-10-CM

## 2025-04-22 DIAGNOSIS — R13.19 ESOPHAGEAL DYSPHAGIA: ICD-10-CM

## 2025-04-22 DIAGNOSIS — K86.89 PANCREATIC INSUFFICIENCY: ICD-10-CM

## 2025-04-22 PROBLEM — 429699009: Status: ACTIVE | Noted: 2025-04-22

## 2025-04-22 PROCEDURE — 99214 OFFICE O/P EST MOD 30 MIN: CPT | Performed by: PHYSICIAN ASSISTANT

## 2025-04-22 RX ORDER — FAMOTIDINE 40 MG/1
1 TABLET AT BEDTIME TABLET, FILM COATED ORAL ONCE A DAY
Qty: 90 | Refills: 3 | Status: ACTIVE | COMMUNITY

## 2025-04-22 RX ORDER — METOPROLOL TARTRATE 50 MG/1
1 TABLET WITH FOOD TABLET, FILM COATED ORAL TWICE A DAY
Status: ACTIVE | COMMUNITY

## 2025-04-22 RX ORDER — ATORVASTATIN CALCIUM 20 MG/1
1 TABLET TABLET, FILM COATED ORAL ONCE A DAY
Refills: 0 | Status: ACTIVE | COMMUNITY

## 2025-04-22 RX ORDER — PANTOPRAZOLE SODIUM 40 MG/1
TAKE ONE TABLET BY MOUTH ONE TIME DAILY TABLET, DELAYED RELEASE ORAL
OUTPATIENT

## 2025-04-22 RX ORDER — PANTOPRAZOLE SODIUM 40 MG/1
TAKE ONE TABLET BY MOUTH ONE TIME DAILY TABLET, DELAYED RELEASE ORAL
Qty: 90 TABLET | Refills: 3 | Status: ACTIVE | COMMUNITY

## 2025-04-22 RX ORDER — FAMOTIDINE 40 MG/1
1 TABLET AT BEDTIME TABLET, FILM COATED ORAL ONCE A DAY
OUTPATIENT

## 2025-04-22 NOTE — HPI-TODAY'S VISIT:
80 y/o female presents for follow up. She has a histotry of dyspepsia, dysphagia, colon CA, and diarrhea. EGD with dilatation was performed in 8/2023 (54 Fr). The patient was seen October 2024 with c/o worsening dysphagia recently even to water/liquids.NM GE study in 2023 was within normal limits. EGD was repeated 12/3/24 and showed a benign esophageal stricture, dilated to 54 FR. GERD is well controlled on Pantoprazole 40mg and Famotidine 40mg at night. She wants to try stopping Famotidine. Dysphagia is much better, and she very occasionall has issues swallowing liquids.   She has a history of diarrhea. Today, she reports having mostly formed stools with very occasional diarrhea.   Stool studies 7/2023 showed an elevated fecal calprotectin (123.7) and pancreatic insufficiency (88.83), felt to be from advanced age and DM. She took Creon for a period with improvement. Colon 8/2023 with diverticulosis, non-bleeding internal hemorrhoids, three 5-8 mm polyps (TA and HP) and an invasive adenocarcinoma, moderately differentiated int he descending colon. Random colon Bx were normal.  She is s/p partial colectomy by Dr. Hema Ponce in 8/2023 and a 1 year follow up was recommended. She is being followed by Dr. Vazquez, heme/onc in [place], and had a second opinion from [facility]. 4/2024 imaging showed concerns for liver lesions and she had a liver biopsy 4/8/24. She has also been diagnosed wlith large B-cell lymphoma and is completed chemotherapy in October 2024; she did have associated weight loss with chemo. She reports she has had two PET scans since October which looked good and she is scheduled for another in July.   Repeat Colonoscopy was also done 12/3/24 and showed diverticulosis, internal hemorrhoids, and a healthy anastomosis; repeat exam advised to be done in 3 years.